# Patient Record
Sex: MALE | Race: WHITE | NOT HISPANIC OR LATINO | Employment: OTHER | URBAN - METROPOLITAN AREA
[De-identification: names, ages, dates, MRNs, and addresses within clinical notes are randomized per-mention and may not be internally consistent; named-entity substitution may affect disease eponyms.]

---

## 2017-01-03 ENCOUNTER — PATIENT OUTREACH (OUTPATIENT)
Dept: HEALTH INFORMATION MANAGEMENT | Facility: OTHER | Age: 82
End: 2017-01-03

## 2017-01-04 ENCOUNTER — PATIENT OUTREACH (OUTPATIENT)
Dept: HEALTH INFORMATION MANAGEMENT | Facility: OTHER | Age: 82
End: 2017-01-04

## 2017-01-10 ENCOUNTER — OFFICE VISIT (OUTPATIENT)
Dept: MEDICAL GROUP | Facility: CLINIC | Age: 82
End: 2017-01-10
Payer: MEDICARE

## 2017-01-10 VITALS
WEIGHT: 181.9 LBS | OXYGEN SATURATION: 94 % | RESPIRATION RATE: 18 BRPM | SYSTOLIC BLOOD PRESSURE: 122 MMHG | HEIGHT: 70 IN | HEART RATE: 68 BPM | TEMPERATURE: 98.1 F | DIASTOLIC BLOOD PRESSURE: 60 MMHG | BODY MASS INDEX: 26.04 KG/M2

## 2017-01-10 DIAGNOSIS — E78.5 DYSLIPIDEMIA: ICD-10-CM

## 2017-01-10 DIAGNOSIS — I10 ESSENTIAL HYPERTENSION: ICD-10-CM

## 2017-01-10 DIAGNOSIS — I25.10 CAD IN NATIVE ARTERY: ICD-10-CM

## 2017-01-10 DIAGNOSIS — R73.09 ABNORMAL BLOOD SUGAR: ICD-10-CM

## 2017-01-10 DIAGNOSIS — Z09 HOSPITAL DISCHARGE FOLLOW-UP: ICD-10-CM

## 2017-01-10 PROCEDURE — 99215 OFFICE O/P EST HI 40 MIN: CPT | Performed by: FAMILY MEDICINE

## 2017-01-10 ASSESSMENT — ENCOUNTER SYMPTOMS
FOCAL WEAKNESS: 0
CONSTIPATION: 0
CONSTITUTIONAL NEGATIVE: 1
SHORTNESS OF BREATH: 0
WHEEZING: 0
HEADACHES: 0
POLYDIPSIA: 0
DIARRHEA: 0
DEPRESSION: 0
MUSCULOSKELETAL NEGATIVE: 1
TINGLING: 0
FEVER: 0
SPUTUM PRODUCTION: 0
ABDOMINAL PAIN: 0
HEARTBURN: 0
BLURRED VISION: 0
PALPITATIONS: 0
BRUISES/BLEEDS EASILY: 0
BLOOD IN STOOL: 0
SENSORY CHANGE: 0
VOMITING: 0
COUGH: 0
ORTHOPNEA: 0
CLAUDICATION: 0
DIZZINESS: 0
NAUSEA: 0
SPEECH CHANGE: 0
CHILLS: 0
DOUBLE VISION: 0

## 2017-01-10 NOTE — PROGRESS NOTES
Subjective:     Jose Angel George is a 87 y.o. male who presents for Hospital Follow-up.  Chart and discharge summary reviewed.   Date of discharge 1/3/17.  48- hour post discharge RN call completed on 1/4/17 and documented in the medical record by Jc Fernandez RN.     HPI: Recently hospitalized for acute MI. Patient had no previous history of coronary artery disease. He was admitted with chest pain and found to have a non-ST elevation MI. He underwent cardiac catheterization with stent placement ×3.    Patient also has a history of hypertension and was on losartan. He was switched to metoprolol.  The patient was also started on atorvastatin. LDL was low at 29.    Review of his hospital records also showed a mildly elevated blood sugar with a hemoglobin A1c of 6.6. He denies any history of diabetes.    Since returning home, patient reports feeling well.     The patient has questions regarding hospitalization or discharge: a few questions about his stent.  The patient denied weakness; denies difficulty taking care of self at home.  Patient reports taking medications as instructed.      Allergies:   Review of patient's allergies indicates no known allergies.    Social History:  Social History   Substance Use Topics   • Smoking status: Former Smoker -- 1.50 packs/day for 25 years     Types: Cigarettes     Quit date: 01/02/1970   • Smokeless tobacco: Never Used   • Alcohol Use: No        ROS:  Review of Systems   Constitutional: Negative.  Negative for fever, chills and malaise/fatigue.   HENT: Negative for congestion.    Eyes: Negative for blurred vision and double vision.   Respiratory: Negative for cough, sputum production, shortness of breath and wheezing.    Cardiovascular: Negative for chest pain, palpitations, orthopnea, claudication and leg swelling.   Gastrointestinal: Negative for heartburn, nausea, vomiting, abdominal pain, diarrhea, constipation, blood in stool and melena.   Genitourinary: Negative  "for dysuria, urgency and frequency.   Musculoskeletal: Negative.    Skin: Negative.    Neurological: Negative for dizziness, tingling, sensory change, speech change, focal weakness and headaches.   Endo/Heme/Allergies: Negative for polydipsia. Does not bruise/bleed easily.   Psychiatric/Behavioral: Negative for depression.        Objective:     Blood pressure 122/60, pulse 68, temperature 36.7 °C (98.1 °F), resp. rate 18, height 1.778 m (5' 10\"), weight 82.509 kg (181 lb 14.4 oz), SpO2 94 %.       Physical Exam:    GEN:  Alert, oriented, in no distress  HEENT:  PERRLA, EOMI, TM's clear bilaterally, oropharynx clear without erythema or exudates.  NECK;  Supple without adenopathy or thyromegaly.  No JVD or bruits.  LUNGS:  Clear to auscultation without rales, rhonci, or wheezes.  CV:  Heart RRR without murmurs or S3 or S4  ABD:  Soft, nontender, nondistended, normal bowel sounds.  No hepatosplenomegaly.  EXT:  Without cyanosis, clubbing, or edema.  NEURO:  Cranial nerves II through XII intact.  Motor function and sensation intact.  SKIN: No rashes or suspicious lesions.  PSYCH:  Behavior is appropriate.        Assessment and Plan:     Hospital follow-up:   Hospitalization and results reviewed with patient. High risk conditions requiring teaching or care coordination were identified and addressed.The patient demonstrate understanding of admission and underlying conditions. The patient understands discharge instructions and when to seek medical attention. Medications reviewed including instructions regarding high risk medications, dosing and side effects.    The patient is able to safely adhere to ADL/IADL, treatment and medication regimen, self-manage of high-risk conditions? Yes   The patient requires physical therapy/home health/DME referral? No   The patient requires referral to care coordination/behavioral health/social work?  No   Patient requires referral for pharmacy consult? No   Advance directive/POLST on " file?  No   Counseled on advance directive?  Yes . He is provided with information.    2. CAD in native artery  Status post stent placement ×3. Cardiology follow-up in 3 days. Continue aspirin and Plavix    3. Essential hypertension  -Continue metoprolol    4. Dyslipidemia  -Continue atorvastatin    5. Abnormal blood sugar  -Follow up with PCP.      Medication Reconciliation  Medication list at end of encounter:   Current Outpatient Prescriptions   Medication Sig Dispense Refill   • aspirin (ASA) 325 MG Tab Take 1 Tab by mouth every day. 30 Tab 2   • atorvastatin (LIPITOR) 80 MG tablet Take 1 Tab by mouth every bedtime. 30 Tab 2   • metoprolol SR (TOPROL XL) 25 MG TABLET SR 24 HR Take 1 Tab by mouth 2 Times a Day. 30 Tab 2   • clopidogrel (PLAVIX) 75 MG Tab Take 1 Tab by mouth every day. 30 Tab 2   • latanoprost (XALATAN) 0.005 % Solution Place 1 Drop in both eyes every evening.     • nitroglycerin (NITROSTAT) 0.4 MG SL Tab Place 1 Tab under tongue as needed for Chest Pain (up to 3 doses 5 minutes apart. If chest pain not gone after 3 doses, call 911). 25 Tab 2     No current facility-administered medications for this visit.       Primary care follow-up:  New health conditions identified during hospitalization? Yes . Coronary Artery Disease  Labs/pathology/imaging requires future PCP follow-up?  Yes . Elevated BS, glycohemoglobin  Changes to medications during hospitalization or today? Yes All medications are new.    Recommended followup: No Follow-up on file. with Pcp Pt States None   Future Appointments       Provider Department Center    1/13/2017 2:40 PM BRENDON Cooper [facility]       Scheduled to establish care with Dr. Silva on 1/13/17.    Patient Instruction  Patient provided with educational material on discharge diagnosis and management of symptoms/red flags. Patient instructed to keep follow-up appointments and to bring written questions and and actual  medications to each office visit. Patient instructed to call PCP/specialist with any problems/questions/concerns. Patient verbalizes understanding and has no further questions at this time.    Face-to-face transitional care management services with high medical decision complexity were provided.

## 2017-01-10 NOTE — PATIENT INSTRUCTIONS
If you need further assistance, or have any questions; concerns or lingering symptoms before seeing your Primary Care Provider or specialist.     Do not hesitate to contact us.     Please contact us at the Post-Hospital Follow Up Program at [phone].   Our offices hours are Monday-Friday 8 am-5 pm.

## 2017-01-13 ENCOUNTER — OFFICE VISIT (OUTPATIENT)
Dept: CARDIOLOGY | Facility: MEDICAL CENTER | Age: 82
End: 2017-01-13
Payer: MEDICARE

## 2017-01-13 VITALS
HEIGHT: 70 IN | BODY MASS INDEX: 26.6 KG/M2 | HEART RATE: 72 BPM | OXYGEN SATURATION: 96 % | WEIGHT: 185.8 LBS | DIASTOLIC BLOOD PRESSURE: 68 MMHG | SYSTOLIC BLOOD PRESSURE: 110 MMHG

## 2017-01-13 DIAGNOSIS — I25.10 CAD IN NATIVE ARTERY: ICD-10-CM

## 2017-01-13 DIAGNOSIS — I44.7 LBBB (LEFT BUNDLE BRANCH BLOCK): ICD-10-CM

## 2017-01-13 DIAGNOSIS — E78.5 DYSLIPIDEMIA: ICD-10-CM

## 2017-01-13 DIAGNOSIS — I10 ESSENTIAL HYPERTENSION: ICD-10-CM

## 2017-01-13 DIAGNOSIS — I21.3 ST ELEVATION MYOCARDIAL INFARCTION (STEMI), UNSPECIFIED ARTERY (HCC): ICD-10-CM

## 2017-01-13 DIAGNOSIS — Z95.5 STENTED CORONARY ARTERY: ICD-10-CM

## 2017-01-13 LAB — EKG IMPRESSION: NORMAL

## 2017-01-13 PROCEDURE — 99214 OFFICE O/P EST MOD 30 MIN: CPT | Performed by: NURSE PRACTITIONER

## 2017-01-13 PROCEDURE — 93000 ELECTROCARDIOGRAM COMPLETE: CPT | Performed by: NURSE PRACTITIONER

## 2017-01-13 ASSESSMENT — ENCOUNTER SYMPTOMS
SHORTNESS OF BREATH: 0
HEARTBURN: 0
NAUSEA: 0
TREMORS: 0
ABDOMINAL PAIN: 0
WEAKNESS: 0
MUSCULOSKELETAL NEGATIVE: 1
ORTHOPNEA: 0
BLURRED VISION: 0
LOSS OF CONSCIOUSNESS: 0
HEADACHES: 0
CHILLS: 0
TINGLING: 0
DIARRHEA: 0
DOUBLE VISION: 0
VOMITING: 0
SPUTUM PRODUCTION: 0
SENSORY CHANGE: 0
HEMOPTYSIS: 0
DIZZINESS: 0
SPEECH CHANGE: 0
PALPITATIONS: 0
FOCAL WEAKNESS: 0
SORE THROAT: 0
WEIGHT LOSS: 0
FEVER: 0
BLOOD IN STOOL: 0
PND: 0
WHEEZING: 0
COUGH: 0

## 2017-01-13 NOTE — MR AVS SNAPSHOT
"        Jose Angel George   2017 2:40 PM   Office Visit   MRN: [id number]    Department:  [facility]   Dept Phone:  [phone]    Description:  Male : 3/31/1929   Provider:  BRENDON Cooper           Reason for Visit     Hospital Follow-up           Allergies as of 2017     No Known Allergies      You were diagnosed with     ST elevation myocardial infarction (STEMI), unspecified artery (HCC)   [9598367]       Essential hypertension   [5265809]       CAD in native artery   [946850]       Dyslipidemia   [045747]       Stented coronary artery   [680603]       LBBB (left bundle branch block)   [096424]         Vital Signs     Blood Pressure Pulse Height Weight Body Mass Index Oxygen Saturation    110/68 mmHg 72 1.778 m (5' 10\") 84.278 kg (185 lb 12.8 oz) 26.66 kg/m2 96%    Smoking Status                   Former Smoker           Basic Information     Date Of Birth Sex Race Ethnicity Preferred Language    3/31/1929 Male White Non- English      Your appointments     2017  1:00 PM   FOLLOW UP with SJ Castro   [facility] (--)    [address], [address]  [place] [address] [phone]              Problem List              ICD-10-CM Priority Class Noted - Resolved    Essential hypertension I10   2016 - Present    Primary osteoarthritis involving multiple joints M15.0   2016 - Present    Abnormal blood sugar R73.09   1/10/2017 - Present    CAD in native artery I25.10   1/10/2017 - Present    Dyslipidemia E78.5   1/10/2017 - Present    Stented coronary artery Z95.5   2017 - Present    LBBB (left bundle branch block) I44.7   2017 - Present      Health Maintenance        Date Due Completion Dates    IMM DTaP/Tdap/Td Vaccine (1 - Tdap) 3/31/1948 ---    IMM ZOSTER VACCINE 3/31/1989 ---    IMM PNEUMOCOCCAL 65+ (ADULT) LOW/MEDIUM RISK SERIES (1 of 2 - PCV13) 3/31/1994 ---            Results       Current " Immunizations     Influenza Vaccine Adult HD 10/11/2016 10:24 AM      Below and/or attached are the medications your provider expects you to take. Review all of your home medications and newly ordered medications with your provider and/or pharmacist. Follow medication instructions as directed by your provider and/or pharmacist. Please keep your medication list with you and share with your provider. Update the information when medications are discontinued, doses are changed, or new medications (including over-the-counter products) are added; and carry medication information at all times in the event of emergency situations     Allergies:  No Known Allergies          Medications  Valid as of: January 13, 2017 -  3:04 PM    Generic Name Brand Name Tablet Size Instructions for use    Aspirin (Tab)  MG Take 1 Tab by mouth every day.        Atorvastatin Calcium (Tab) LIPITOR 80 MG Take 1 Tab by mouth every bedtime.        Clopidogrel Bisulfate (Tab) PLAVIX 75 MG Take 1 Tab by mouth every day.        Latanoprost (Solution) XALATAN 0.005 % Place 1 Drop in both eyes every evening.        Metoprolol Succinate (TABLET SR 24 HR) TOPROL XL 25 MG Take 1 Tab by mouth 2 Times a Day.        Nitroglycerin (SL Tab) NITROSTAT 0.4 MG Place 1 Tab under tongue as needed for Chest Pain (up to 3 doses 5 minutes apart. If chest pain not gone after 3 doses, call 911).        .                 Medicines prescribed today were sent to:     [facility] [address] [place] [address] - [address]    [address] [address]    Phone: [phone] Fax: [phone]    Open 24 Hours?: Yes      Medication refill instructions:       If your prescription bottle indicates you have medication refills left, it is not necessary to call your provider’s office. Please contact your pharmacy and they will refill your medication.    If your prescription bottle indicates you do not have any refills left, you may  request refills at any time through one of the following ways: The online [employer] system (except Urgent Care), by calling your provider’s office, or by asking your pharmacy to contact your provider’s office with a refill request. Medication refills are processed only during regular business hours and may not be available until the next business day. Your provider may request additional information or to have a follow-up visit with you prior to refilling your medication.   *Please Note: Medication refills are assigned a new Rx number when refilled electronically. Your pharmacy may indicate that no refills were authorized even though a new prescription for the same medication is available at the pharmacy. Please request the medicine by name with the pharmacy before contacting your provider for a refill.           MyChart Status: Patient Declined

## 2017-01-13 NOTE — PROGRESS NOTES
Subjective:   Jose Angel George is a 87 y.o. male who presents today for hospital follow up after STEMI and placement of JAZMYNE to RCA x 2(proximal, mid) and Distal Circ.    Angiogram conclusions per Dr. Serna's cath note:  1.  EF 56%.  Inferior wall motion abnormalities.  2.  LAD mid diffuse moderate.  3.   Diagonal ostial 70%.  Proximal 90%.  4.  Ramus is a tiny caliber mid 95%.  5.  Circumflex distal 90%.  6.  RCA proximal 95%, mid 90%.  7.  Proximal RCA stents a 4.0x16 mm JAZMYNE.  8.  Mid RCA stent 4.0x24 mm JAZMYNE.  9.  Distal circumflex 2.75x12 mm JAZMYNE.    Today in follow up he states that he has not had any recurrence of chest pain/pressure or angina symptoms since discharge.  He has not had any dyspnea, presyncope, syncope, or peripheral edema.    He states that he has taken his medication as prescribed but has yet to  his PRN NTG from the pharmacy secondary to the weather.      Past Medical History   Diagnosis Date   • Hypertension      Past Surgical History   Procedure Laterality Date   • Hernia repair     • Rotator cuff repair     • Eye surgery       Family History   Problem Relation Age of Onset   • Stroke Mother    • Lung Disease Father    • Cancer Father    • Hypertension Father    • Heart Disease Father    • Hypertension Brother    • Diabetes Brother    • Heart Disease Brother      History   Smoking status   • Former Smoker -- 1.50 packs/day for 25 years   • Types: Cigarettes   • Quit date: 01/02/1970   Smokeless tobacco   • Never Used     No Known Allergies  Outpatient Encounter Prescriptions as of 1/13/2017   Medication Sig Dispense Refill   • aspirin (ASA) 325 MG Tab Take 1 Tab by mouth every day. 30 Tab 2   • atorvastatin (LIPITOR) 80 MG tablet Take 1 Tab by mouth every bedtime. 30 Tab 2   • metoprolol SR (TOPROL XL) 25 MG TABLET SR 24 HR Take 1 Tab by mouth 2 Times a Day. 30 Tab 2   • clopidogrel (PLAVIX) 75 MG Tab Take 1 Tab by mouth every day. 30 Tab 2   • latanoprost (XALATAN) 0.005 %  "Solution Place 1 Drop in both eyes every evening.     • nitroglycerin (NITROSTAT) 0.4 MG SL Tab Place 1 Tab under tongue as needed for Chest Pain (up to 3 doses 5 minutes apart. If chest pain not gone after 3 doses, call 911). 25 Tab 2     No facility-administered encounter medications on file as of 1/13/2017.     Review of Systems   Constitutional: Negative for fever, chills, weight loss and malaise/fatigue.   HENT: Negative for congestion, sore throat and tinnitus.    Eyes: Negative for blurred vision and double vision.   Respiratory: Negative for cough, hemoptysis, sputum production, shortness of breath and wheezing.    Cardiovascular: Negative for chest pain, palpitations, orthopnea, leg swelling and PND.   Gastrointestinal: Negative for heartburn, nausea, vomiting, abdominal pain, diarrhea, blood in stool and melena.   Genitourinary: Negative.    Musculoskeletal: Negative.    Skin: Negative for rash.   Neurological: Negative for dizziness, tingling, tremors, sensory change, speech change, focal weakness, loss of consciousness, weakness and headaches.        Objective:   /68 mmHg  Pulse 72  Ht 1.778 m (5' 10\")  Wt 84.278 kg (185 lb 12.8 oz)  BMI 26.66 kg/m2  SpO2 96%    Physical Exam   Constitutional: He is oriented to person, place, and time. He appears well-developed and well-nourished.   HENT:   Head: Normocephalic and atraumatic.   Eyes: Conjunctivae are normal.   Neck: Normal range of motion. Neck supple. No JVD present.   Cardiovascular: Normal rate, regular rhythm, normal heart sounds and intact distal pulses.  Exam reveals no gallop and no friction rub.    No murmur heard.  Pulmonary/Chest: Effort normal and breath sounds normal. No respiratory distress. He has no wheezes. He has no rales. He exhibits no tenderness.   Abdominal: Soft. Bowel sounds are normal. There is no tenderness.   Musculoskeletal: He exhibits no edema.   Neurological: He is alert and oriented to person, place, and time. "   Skin: Skin is warm and dry.   Psychiatric: He has a normal mood and affect. His behavior is normal.       Assessment:     1. ST elevation myocardial infarction (STEMI), unspecified artery (HCC)     2. Essential hypertension  EKG   3. CAD in native artery     4. Dyslipidemia     5. Stented coronary artery         Medical Decision Making:  Today's Assessment / Status / Plan:   1. STEMI S/P JAZMYNE x 2 RCA, JAZMYNE x1 distal CIRC:  - Patient is clinically doing doing well post PCI.  He has had no recurrence of chest pain or angina symptoms.   - We reviewed his medications and indications for medications.  He was under the impression he only needed medications for 3 moths.  I did clarify with him that he will need medication compliance long term and DAPT for at least 1 year.  - We reviewed diet (heart healthy, low sodium) and activity guidelines.  He verbalizes understanding of this.    - Continue ASA, Plavix, Toprol, atorvastatin.     2. LBBB:  - Stable on EKG in office today.    3. Dyslipidemia:  - LDL 50, HDL low at 29.  Tg ok.  Continue Atorvastatin 80 mg.  He denies myalgias.     He travels between 3 locations yearly.  I would like him to be seen once more before he leaves town so he will be seen in approximately 6 weeks for review, sooner if needed.  Collaborating MD/ADD: Kisha.

## 2017-02-19 ENCOUNTER — PATIENT OUTREACH (OUTPATIENT)
Dept: HEALTH INFORMATION MANAGEMENT | Facility: OTHER | Age: 82
End: 2017-02-19

## 2017-02-20 NOTE — PROGRESS NOTES
Attempt #:1     Annual Wellness Visit Scheduling  1. Scheduling Status:Not Scheduled. Patient states they are not interested   [facility]     [facility]

## 2017-02-27 ENCOUNTER — OFFICE VISIT (OUTPATIENT)
Dept: CARDIOLOGY | Facility: MEDICAL CENTER | Age: 82
End: 2017-02-27
Payer: MEDICARE

## 2017-02-27 VITALS
WEIGHT: 173 LBS | BODY MASS INDEX: 24.77 KG/M2 | HEART RATE: 61 BPM | DIASTOLIC BLOOD PRESSURE: 62 MMHG | OXYGEN SATURATION: 91 % | SYSTOLIC BLOOD PRESSURE: 130 MMHG | HEIGHT: 70 IN

## 2017-02-27 DIAGNOSIS — E78.5 DYSLIPIDEMIA: ICD-10-CM

## 2017-02-27 DIAGNOSIS — I10 ESSENTIAL HYPERTENSION: ICD-10-CM

## 2017-02-27 DIAGNOSIS — Z95.5 STENTED CORONARY ARTERY: ICD-10-CM

## 2017-02-27 DIAGNOSIS — I25.10 CAD IN NATIVE ARTERY: Primary | ICD-10-CM

## 2017-02-27 DIAGNOSIS — H91.91 HEARING LOSS OF RIGHT EAR: ICD-10-CM

## 2017-02-27 DIAGNOSIS — I44.7 LBBB (LEFT BUNDLE BRANCH BLOCK): ICD-10-CM

## 2017-02-27 PROCEDURE — 1101F PT FALLS ASSESS-DOCD LE1/YR: CPT | Performed by: NURSE PRACTITIONER

## 2017-02-27 PROCEDURE — G8432 DEP SCR NOT DOC, RNG: HCPCS | Performed by: NURSE PRACTITIONER

## 2017-02-27 PROCEDURE — 1036F TOBACCO NON-USER: CPT | Performed by: NURSE PRACTITIONER

## 2017-02-27 PROCEDURE — 99214 OFFICE O/P EST MOD 30 MIN: CPT | Performed by: NURSE PRACTITIONER

## 2017-02-27 PROCEDURE — G8420 CALC BMI NORM PARAMETERS: HCPCS | Performed by: NURSE PRACTITIONER

## 2017-02-27 PROCEDURE — G8598 ASA/ANTIPLAT THER USED: HCPCS | Performed by: NURSE PRACTITIONER

## 2017-02-27 PROCEDURE — G8482 FLU IMMUNIZE ORDER/ADMIN: HCPCS | Performed by: NURSE PRACTITIONER

## 2017-02-27 PROCEDURE — 4040F PNEUMOC VAC/ADMIN/RCVD: CPT | Mod: 8P | Performed by: NURSE PRACTITIONER

## 2017-02-27 RX ORDER — ATORVASTATIN CALCIUM 80 MG/1
80 TABLET, FILM COATED ORAL EVERY EVENING
Qty: 90 TAB | Refills: 3 | Status: SHIPPED | OUTPATIENT
Start: 2017-02-27 | End: 2018-10-01 | Stop reason: SDUPTHER

## 2017-02-27 RX ORDER — ASPIRIN 81 MG/1
81 TABLET, CHEWABLE ORAL DAILY
Qty: 100 TAB | Refills: 3 | COMMUNITY
Start: 2017-02-27 | End: 2017-03-01 | Stop reason: SDUPTHER

## 2017-02-27 RX ORDER — CLOPIDOGREL BISULFATE 75 MG/1
75 TABLET ORAL DAILY
Qty: 90 TAB | Refills: 3 | Status: SHIPPED | OUTPATIENT
Start: 2017-02-27 | End: 2018-04-18 | Stop reason: SDUPTHER

## 2017-02-27 RX ORDER — METOPROLOL SUCCINATE 25 MG/1
25 TABLET, EXTENDED RELEASE ORAL 2 TIMES DAILY
Qty: 180 TAB | Refills: 3 | Status: SHIPPED | OUTPATIENT
Start: 2017-02-27 | End: 2018-10-01 | Stop reason: SDUPTHER

## 2017-02-27 ASSESSMENT — ENCOUNTER SYMPTOMS
CLAUDICATION: 0
SHORTNESS OF BREATH: 0
COUGH: 0
ORTHOPNEA: 0
ABDOMINAL PAIN: 0
PND: 0
DIZZINESS: 0
WEAKNESS: 0
MYALGIAS: 0
PALPITATIONS: 0

## 2017-02-27 NOTE — PROGRESS NOTES
Subjective:   Jose Angel George is a 87 y.o. male who presents today for follow-up on coronary artery disease and ST elevation MI in January. [facility] after receiving 3 stents for his coronary artery disease. He received drug-eluting stents to the mid RCA, distal RCA and proximal circumflex.     Since being out of the hospital, he has not had any chest symptoms. He did come down with a bronchitis which caused him to have a bronchospastic cough for proximally 2 weeks. This was treated with antiemetics and his cough has resolved.    He denies any shortness of breath except when he had the bronchitis. No ankle edema. He does complain of bilateral hip pain which makes it difficult for him to walk.    He spends part of the year down in [facility] and will be heading down there on Sunday, March 5, 2017. He states he might not return to [place] due to the snow and the difficulties of the delgado here.    Past Medical History   Diagnosis Date   • Hypertension    • Hyperlipidemia      Past Surgical History   Procedure Laterality Date   • Hernia repair     • Rotator cuff repair     • Eye surgery     • Cardiac cath  12/31/16     2 stents RCA, 1 stent Circ, All JAZMYNE.     Family History   Problem Relation Age of Onset   • Stroke Mother    • Lung Disease Father    • Cancer Father    • Hypertension Father    • Heart Disease Father    • Hypertension Brother    • Diabetes Brother    • Heart Disease Brother      History   Smoking status   • Former Smoker -- 1.50 packs/day for 25 years   • Types: Cigarettes   • Quit date: 01/02/1970   Smokeless tobacco   • Never Used     No Known Allergies  Outpatient Encounter Prescriptions as of 2/27/2017   Medication Sig Dispense Refill   • aspirin (ASA) 81 MG Chew Tab chewable tablet Take 1 Tab by mouth every day. 100 Tab 3   • atorvastatin (LIPITOR) 80 MG tablet Take 1 Tab by mouth every evening. 90 Tab 3   • metoprolol SR (TOPROL XL) 25 MG TABLET SR 24  "HR Take 1 Tab by mouth 2 Times a Day. 180 Tab 3   • clopidogrel (PLAVIX) 75 MG Tab Take 1 Tab by mouth every day. 90 Tab 3   • nitroglycerin (NITROSTAT) 0.4 MG SL Tab Place 1 Tab under tongue as needed for Chest Pain (up to 3 doses 5 minutes apart. If chest pain not gone after 3 doses, call 911). 25 Tab 2   • latanoprost (XALATAN) 0.005 % Solution Place 1 Drop in both eyes every evening.     • [DISCONTINUED] aspirin (ASA) 325 MG Tab Take 1 Tab by mouth every day. 30 Tab 2   • [DISCONTINUED] atorvastatin (LIPITOR) 80 MG tablet Take 1 Tab by mouth every bedtime. 30 Tab 2   • [DISCONTINUED] metoprolol SR (TOPROL XL) 25 MG TABLET SR 24 HR Take 1 Tab by mouth 2 Times a Day. 30 Tab 2   • [DISCONTINUED] clopidogrel (PLAVIX) 75 MG Tab Take 1 Tab by mouth every day. 30 Tab 2     No facility-administered encounter medications on file as of 2/27/2017.     Review of Systems   Constitutional: Negative for malaise/fatigue.   HENT: Positive for hearing loss (right ear.).    Respiratory: Negative for cough and shortness of breath.    Cardiovascular: Negative for chest pain, palpitations, orthopnea, claudication, leg swelling and PND.   Gastrointestinal: Negative for abdominal pain.   Musculoskeletal: Positive for joint pain (bilateral hip arthritis). Negative for myalgias.   Neurological: Negative for dizziness and weakness.        Objective:   /62 mmHg  Pulse 61  Ht 1.778 m (5' 10\")  Wt 78.472 kg (173 lb)  BMI 24.82 kg/m2  SpO2 91%    Physical Exam   Constitutional: He appears well-developed and well-nourished.   HENT:   Head: Normocephalic.   Hearing loss right side, long-standing.   Eyes: Conjunctivae are normal.   Neck: No JVD present. No thyromegaly present.   Cardiovascular: Normal rate, regular rhythm and normal heart sounds.    Pulmonary/Chest: Effort normal and breath sounds normal. He has no wheezes. He has no rales.   Abdominal: Soft. Bowel sounds are normal. He exhibits no distension. There is no tenderness. "   Musculoskeletal: He exhibits no edema.   Neurological: He is alert.   Skin: Skin is warm and dry.   Psychiatric: He has a normal mood and affect.     JR first 2017: Transthoracic Echo Report  Contrast was used to enhance visualization of the endocardial border.  Left ventricular systolic function is mildly reduced.  Left ventricular ejection fraction is visually estimated to be 50%.  Global hypokinesis most prominent in the inferior and inferolateral   walls.  Grade I diastolic dysfunction.  The right ventricle was normal in size and function.  No evidence of valvular abnormality based on Doppler evaluation.     Results for LACI CHOWDARY (MRN [id number]) as of 2/27/2017 14:01   Ref. Range 1/1/2017 04:35   Sodium Latest Ref Range: 135-145 mmol/L 137   Potassium Latest Ref Range: 3.6-5.5 mmol/L 4.1   Chloride Latest Ref Range:  mmol/L 108   Co2 Latest Ref Range: 20-33 mmol/L 22   Anion Gap Latest Ref Range: 0.0-11.9  7.0   Glucose Latest Ref Range: 65-99 mg/dL 116 (H)   Bun Latest Ref Range: 8-22 mg/dL 22   Creatinine Latest Ref Range: 0.50-1.40 mg/dL 1.10   GFR If  Latest Ref Range: >60 mL/min/1.73 m 2 >60   GFR If Non  Latest Ref Range: >60 mL/min/1.73 m 2 >60   Calcium Latest Ref Range: 8.5-10.5 mg/dL 8.8   Glycohemoglobin Latest Ref Range: 0.0-5.6 % 6.6 (H)   Estim. Avg Glu Latest Units: mg/dL 143   Cholesterol,Tot Latest Ref Range: 100-199 mg/dL 97 (L)   Triglycerides Latest Ref Range: 0-149 mg/dL 90   HDL Latest Ref Range: >=40 mg/dL 29 (A)   LDL Latest Ref Range: <100 mg/dL 50       Assessment:     1. CAD in native artery  metoprolol SR (TOPROL XL) 25 MG TABLET SR 24 HR   2. Stented coronary artery  clopidogrel (PLAVIX) 75 MG Tab   3. LBBB (left bundle branch block)     4. Dyslipidemia  atorvastatin (LIPITOR) 80 MG tablet    COMP METABOLIC PANEL    LIPID PROFILE   5. Essential hypertension     6. Hearing loss of right ear         Medical Decision Making:  Today's  Assessment / Status / Plan:     Coronary artery disease: Status post ST elevation MI. Patient had a peak of troponin of 133 which was probably due to flush out from being stented. His ejection fraction is 50% and shows some inferior hypokinesis therefore he probably has not had much long-term damage. I expect that he would be able to function normally. He has not had any exertional chest discomfort. I will reduce aspirin to 81 mg. He understands that he will need to be on Plavix for least a year.    Left bundle branch block: Present on presentation to the hospital. Unknown if that is long-standing.    Dyslipidemia: He has a low HDL which is probably the reason that he has coronary artery disease. We'll check lipids and metabolic panel at 3 months.    Hypertension: His blood pressure is well-controlled. He tells me that he was previously on losartan for blood pressure which has been discontinued. His blood pressure becomes elevated we could restart losartan if needed.    Hearing loss: He is rather hard of hearing.  I have encouraged him to get a hearing aid.    He tells me that he is planning on going to [facility] and leaving on March 5, 2017. He plans to establish care in that area. I would like him to follow-up at 3 months and do lab work one week prior to his appointment. He will follow up sooner if he has exertional chest symptoms. He is always welcome to return to our practice.    Collaborating Provider: Dr. Henry Gomez.

## 2017-02-27 NOTE — Clinical Note
[facility]   [address], [facility] [address]  [place] [address]  Phone: [phone]  Fax: [phone]              Jose Angel George  3/31/1929    Encounter Date: 2/27/2017    SJ Castro          PROGRESS NOTE:  Subjective:   Jose Angel George is a 87 y.o. male who presents today for follow-up on coronary artery disease and ST elevation MI in January. He was [facility] after receiving 3 stents for his coronary artery disease. He received drug-eluting stents to the mid RCA, distal RCA and proximal circumflex.     Since being out of the hospital, he has not had any chest symptoms. He did come down with a bronchitis which caused him to have a bronchospastic cough for proximally 2 weeks. This was treated with antiemetics and his cough has resolved.    He denies any shortness of breath except when he had the bronchitis. No ankle edema. He does complain of bilateral hip pain which makes it difficult for him to walk.    He spends part of the year down in [facility] and will be heading down there on Sunday, March 5, 2017. He states he might not return to [place] due to the snow and the difficulties of the delgado here.    Past Medical History   Diagnosis Date   • Hypertension    • Hyperlipidemia      Past Surgical History   Procedure Laterality Date   • Hernia repair     • Rotator cuff repair     • Eye surgery     • Cardiac cath  12/31/16     2 stents RCA, 1 stent Circ, All JAZMYNE.     Family History   Problem Relation Age of Onset   • Stroke Mother    • Lung Disease Father    • Cancer Father    • Hypertension Father    • Heart Disease Father    • Hypertension Brother    • Diabetes Brother    • Heart Disease Brother      History   Smoking status   • Former Smoker -- 1.50 packs/day for 25 years   • Types: Cigarettes   • Quit date: 01/02/1970   Smokeless tobacco   • Never Used     No Known Allergies  Outpatient Encounter Prescriptions as  "of 2/27/2017   Medication Sig Dispense Refill   • aspirin (ASA) 81 MG Chew Tab chewable tablet Take 1 Tab by mouth every day. 100 Tab 3   • atorvastatin (LIPITOR) 80 MG tablet Take 1 Tab by mouth every evening. 90 Tab 3   • metoprolol SR (TOPROL XL) 25 MG TABLET SR 24 HR Take 1 Tab by mouth 2 Times a Day. 180 Tab 3   • clopidogrel (PLAVIX) 75 MG Tab Take 1 Tab by mouth every day. 90 Tab 3   • nitroglycerin (NITROSTAT) 0.4 MG SL Tab Place 1 Tab under tongue as needed for Chest Pain (up to 3 doses 5 minutes apart. If chest pain not gone after 3 doses, call 911). 25 Tab 2   • latanoprost (XALATAN) 0.005 % Solution Place 1 Drop in both eyes every evening.     • [DISCONTINUED] aspirin (ASA) 325 MG Tab Take 1 Tab by mouth every day. 30 Tab 2   • [DISCONTINUED] atorvastatin (LIPITOR) 80 MG tablet Take 1 Tab by mouth every bedtime. 30 Tab 2   • [DISCONTINUED] metoprolol SR (TOPROL XL) 25 MG TABLET SR 24 HR Take 1 Tab by mouth 2 Times a Day. 30 Tab 2   • [DISCONTINUED] clopidogrel (PLAVIX) 75 MG Tab Take 1 Tab by mouth every day. 30 Tab 2     No facility-administered encounter medications on file as of 2/27/2017.     Review of Systems   Constitutional: Negative for malaise/fatigue.   HENT: Positive for hearing loss (right ear.).    Respiratory: Negative for cough and shortness of breath.    Cardiovascular: Negative for chest pain, palpitations, orthopnea, claudication, leg swelling and PND.   Gastrointestinal: Negative for abdominal pain.   Musculoskeletal: Positive for joint pain (bilateral hip arthritis). Negative for myalgias.   Neurological: Negative for dizziness and weakness.        Objective:   /62 mmHg  Pulse 61  Ht 1.778 m (5' 10\")  Wt 78.472 kg (173 lb)  BMI 24.82 kg/m2  SpO2 91%    Physical Exam   Constitutional: He appears well-developed and well-nourished.   HENT:   Head: Normocephalic.   Hearing loss right side, long-standing.   Eyes: Conjunctivae are normal.   Neck: No JVD present. No thyromegaly " present.   Cardiovascular: Normal rate, regular rhythm and normal heart sounds.    Pulmonary/Chest: Effort normal and breath sounds normal. He has no wheezes. He has no rales.   Abdominal: Soft. Bowel sounds are normal. He exhibits no distension. There is no tenderness.   Musculoskeletal: He exhibits no edema.   Neurological: He is alert.   Skin: Skin is warm and dry.   Psychiatric: He has a normal mood and affect.     JR first 2017: Transthoracic Echo Report  Contrast was used to enhance visualization of the endocardial border.  Left ventricular systolic function is mildly reduced.  Left ventricular ejection fraction is visually estimated to be 50%.  Global hypokinesis most prominent in the inferior and inferolateral   walls.  Grade I diastolic dysfunction.  The right ventricle was normal in size and function.  No evidence of valvular abnormality based on Doppler evaluation.     Results for LACI CHOWDARY (MRN [id number]) as of 2/27/2017 14:01   Ref. Range 1/1/2017 04:35   Sodium Latest Ref Range: 135-145 mmol/L 137   Potassium Latest Ref Range: 3.6-5.5 mmol/L 4.1   Chloride Latest Ref Range:  mmol/L 108   Co2 Latest Ref Range: 20-33 mmol/L 22   Anion Gap Latest Ref Range: 0.0-11.9  7.0   Glucose Latest Ref Range: 65-99 mg/dL 116 (H)   Bun Latest Ref Range: 8-22 mg/dL 22   Creatinine Latest Ref Range: 0.50-1.40 mg/dL 1.10   GFR If  Latest Ref Range: >60 mL/min/1.73 m 2 >60   GFR If Non  Latest Ref Range: >60 mL/min/1.73 m 2 >60   Calcium Latest Ref Range: 8.5-10.5 mg/dL 8.8   Glycohemoglobin Latest Ref Range: 0.0-5.6 % 6.6 (H)   Estim. Avg Glu Latest Units: mg/dL 143   Cholesterol,Tot Latest Ref Range: 100-199 mg/dL 97 (L)   Triglycerides Latest Ref Range: 0-149 mg/dL 90   HDL Latest Ref Range: >=40 mg/dL 29 (A)   LDL Latest Ref Range: <100 mg/dL 50       Assessment:     1. CAD in native artery  metoprolol SR (TOPROL XL) 25 MG TABLET SR 24 HR   2. Stented coronary artery   clopidogrel (PLAVIX) 75 MG Tab   3. LBBB (left bundle branch block)     4. Dyslipidemia  atorvastatin (LIPITOR) 80 MG tablet    COMP METABOLIC PANEL    LIPID PROFILE   5. Essential hypertension     6. Hearing loss of right ear         Medical Decision Making:  Today's Assessment / Status / Plan:     Coronary artery disease: Status post ST elevation MI. Patient had a peak of troponin of 133 which was probably due to flush out from being stented. His ejection fraction is 50% and shows some inferior hypokinesis therefore he probably has not had much long-term damage. I expect that he would be able to function normally. He has not had any exertional chest discomfort. I will reduce aspirin to 81 mg. He understands that he will need to be on Plavix for least a year.    Left bundle branch block: Present on presentation to the hospital. Unknown if that is long-standing.    Dyslipidemia: He has a low HDL which is probably the reason that he has coronary artery disease. We'll check lipids and metabolic panel at 3 months.    Hypertension: His blood pressure is well-controlled. He tells me that he was previously on losartan for blood pressure which has been discontinued. His blood pressure becomes elevated we could restart losartan if needed.    Hearing loss: He is rather hard of hearing.  I have encouraged him to get a hearing aid.    He tells me that he is planning on going to [facility] and leaving on March 5, 2017. He plans to establish care in that area. I would like him to follow-up at 3 months and do lab work one week prior to his appointment. He will follow up sooner if he has exertional chest symptoms. He is always welcome to return to our practice.    Collaborating Provider: Dr. Henry Gomez.        No Recipients

## 2017-02-27 NOTE — MR AVS SNAPSHOT
"        Jose Angel George   2017 1:00 PM   Office Visit   MRN: [id number]    Department:  [facility]   Dept Phone:  [phone]    Description:  Male : 3/31/1929   Provider:  SJ Castro           Reason for Visit     Follow-Up           Allergies as of 2017     No Known Allergies      You were diagnosed with     CAD in native artery   [998974]  -  Primary     Stented coronary artery   [079427]       LBBB (left bundle branch block)   [642755]       Dyslipidemia   [584808]       Essential hypertension   [6947039]         Vital Signs     Blood Pressure Pulse Height Weight Body Mass Index Oxygen Saturation    130/62 mmHg 61 1.778 m (5' 10\") 78.472 kg (173 lb) 24.82 kg/m2 91%    Smoking Status                   Former Smoker           Basic Information     Date Of Birth Sex Race Ethnicity Preferred Language    3/31/1929 Male White Non- English      Problem List              ICD-10-CM Priority Class Noted - Resolved    Essential hypertension I10   2016 - Present    Primary osteoarthritis involving multiple joints M15.0   2016 - Present    Abnormal blood sugar R73.09   1/10/2017 - Present    CAD in native artery I25.10   1/10/2017 - Present    Dyslipidemia E78.5   1/10/2017 - Present    Stented coronary artery Z95.5   2017 - Present    LBBB (left bundle branch block) I44.7   2017 - Present      Health Maintenance        Date Due Completion Dates    IMM DTaP/Tdap/Td Vaccine (1 - Tdap) 3/31/1948 ---    IMM ZOSTER VACCINE 3/31/1989 ---    IMM PNEUMOCOCCAL 65+ (ADULT) LOW/MEDIUM RISK SERIES (1 of 2 - PCV13) 3/31/1994 ---            Current Immunizations     Influenza Vaccine Adult HD 10/11/2016 10:24 AM      Below and/or attached are the medications your provider expects you to take. Review all of your home medications and newly ordered medications with your provider and/or pharmacist. Follow medication instructions as directed by your provider and/or pharmacist. " Please keep your medication list with you and share with your provider. Update the information when medications are discontinued, doses are changed, or new medications (including over-the-counter products) are added; and carry medication information at all times in the event of emergency situations     Allergies:  No Known Allergies          Medications  Valid as of: February 27, 2017 -  1:52 PM    Generic Name Brand Name Tablet Size Instructions for use    Aspirin (Chew Tab) ASA 81 MG Take 1 Tab by mouth every day.        Atorvastatin Calcium (Tab) LIPITOR 80 MG Take 1 Tab by mouth every evening.        Clopidogrel Bisulfate (Tab) PLAVIX 75 MG Take 1 Tab by mouth every day.        Latanoprost (Solution) XALATAN 0.005 % Place 1 Drop in both eyes every evening.        Metoprolol Succinate (TABLET SR 24 HR) TOPROL XL 25 MG Take 1 Tab by mouth 2 Times a Day.        Nitroglycerin (SL Tab) NITROSTAT 0.4 MG Place 1 Tab under tongue as needed for Chest Pain (up to 3 doses 5 minutes apart. If chest pain not gone after 3 doses, call 911).        .                 Medicines prescribed today were sent to:     [facility] [address] [place] [address] [address]    [address] [address]    Phone: [phone] Fax: [phone]    Open 24 Hours?: Yes      Medication refill instructions:       If your prescription bottle indicates you have medication refills left, it is not necessary to call your provider’s office. Please contact your pharmacy and they will refill your medication.    If your prescription bottle indicates you do not have any refills left, you may request refills at any time through one of the following ways: The online [email or web address] system (except Urgent Care), by calling your provider’s office, or by asking your pharmacy to contact your provider’s office with a refill request. Medication refills are processed only during regular business hours and may not be available  until the next business day. Your provider may request additional information or to have a follow-up visit with you prior to refilling your medication.   *Please Note: Medication refills are assigned a new Rx number when refilled electronically. Your pharmacy may indicate that no refills were authorized even though a new prescription for the same medication is available at the pharmacy. Please request the medicine by name with the pharmacy before contacting your provider for a refill.        Your To Do List     Future Labs/Procedures Complete By Expires    COMP METABOLIC PANEL  As directed 2/27/2018    LIPID PROFILE  As directed 2/27/2018         MyChart Status: Patient Declined

## 2017-03-01 DIAGNOSIS — Z95.5 STENTED CORONARY ARTERY: ICD-10-CM

## 2017-03-01 RX ORDER — ASPIRIN 81 MG/1
81 TABLET, CHEWABLE ORAL DAILY
Qty: 100 TAB | Refills: 3 | Status: SHIPPED | OUTPATIENT
Start: 2017-03-01 | End: 2018-10-01 | Stop reason: SDUPTHER

## 2017-04-19 NOTE — PROGRESS NOTES
Attempt #:2    Verify PCP: no    Communication Preference Obtained: yes     Review Care Team: yes    Annual Wellness Visit Scheduling  1. Scheduling Status:Not Scheduled. Patient states they moved out of the area       Care Gap Scheduling (Attempt to Schedule EACH Overdue Care Gap!)     Health Maintenance Due   Topic Date Due   • Annual Wellness Visit  03/31/1929   • IMM DTaP/Tdap/Td Vaccine (1 - Tdap) 03/31/1948   • IMM ZOSTER VACCINE  03/31/1989   • IMM PNEUMOCOCCAL 65+ (ADULT) LOW/MEDIUM RISK SERIES (1 of 2 - PCV13) 03/31/1994         Datezr Activation: declined  Datezr Severo: no  Virtual Visits: no  Opt In to Text Messages: no

## 2017-07-10 ENCOUNTER — TELEPHONE (OUTPATIENT)
Dept: CARDIOLOGY | Facility: MEDICAL CENTER | Age: 82
End: 2017-07-10

## 2018-04-18 ENCOUNTER — OFFICE VISIT (OUTPATIENT)
Dept: CARDIOLOGY | Facility: MEDICAL CENTER | Age: 83
End: 2018-04-18
Payer: MEDICARE

## 2018-04-18 VITALS
OXYGEN SATURATION: 92 % | DIASTOLIC BLOOD PRESSURE: 70 MMHG | HEIGHT: 69 IN | WEIGHT: 170 LBS | SYSTOLIC BLOOD PRESSURE: 140 MMHG | BODY MASS INDEX: 25.18 KG/M2 | HEART RATE: 58 BPM

## 2018-04-18 DIAGNOSIS — I44.7 LBBB (LEFT BUNDLE BRANCH BLOCK): ICD-10-CM

## 2018-04-18 DIAGNOSIS — E78.5 DYSLIPIDEMIA: ICD-10-CM

## 2018-04-18 DIAGNOSIS — Z95.5 STENTED CORONARY ARTERY: ICD-10-CM

## 2018-04-18 DIAGNOSIS — I10 ESSENTIAL HYPERTENSION: ICD-10-CM

## 2018-04-18 DIAGNOSIS — I25.10 CORONARY ARTERY DISEASE, NON-OCCLUSIVE: ICD-10-CM

## 2018-04-18 PROCEDURE — 99214 OFFICE O/P EST MOD 30 MIN: CPT | Performed by: INTERNAL MEDICINE

## 2018-04-18 RX ORDER — CLOPIDOGREL BISULFATE 75 MG/1
75 TABLET ORAL DAILY
Qty: 90 TAB | Refills: 3 | Status: SHIPPED | OUTPATIENT
Start: 2018-04-18 | End: 2019-05-22 | Stop reason: SDUPTHER

## 2018-04-18 ASSESSMENT — ENCOUNTER SYMPTOMS
WHEEZING: 0
PALPITATIONS: 0
MYALGIAS: 0
DIZZINESS: 0
COUGH: 0
LOSS OF CONSCIOUSNESS: 0

## 2018-04-18 NOTE — PROGRESS NOTES
Chief Complaint   Patient presents with   • Coronary artery disease        Subjective:   Jose Angel George is a 89 y.o. male who presents today for follow-up evaluation of CAD, RCA and circumflex stents, hypertension and hyperlipidemia.    Last seen on 2/27/2017 by APN.    Since 2/27/2017 the patient has had no cardiac symptoms.  Had been living in [place], [place] over the past year but is moved back to [place], [place].  Followed at the [facility].  Compliant with medications.   Has problems with dry eyes and is seeking a more humid climate and may be moving to [place].    Past Medical History:   Diagnosis Date   • Hyperlipidemia    • Hypertension      Past Surgical History:   Procedure Laterality Date   • CARDIAC CATH  12/31/16    2 stents RCA, 1 stent Circ, All JAZMYNE.   • EYE SURGERY     • HERNIA REPAIR     • ROTATOR CUFF REPAIR       Family History   Problem Relation Age of Onset   • Stroke Mother    • Lung Disease Father    • Cancer Father    • Hypertension Father    • Heart Disease Father    • Hypertension Brother    • Diabetes Brother    • Heart Disease Brother      Social History     Social History   • Marital status: Single     Spouse name: N/A   • Number of children: N/A   • Years of education: N/A     Occupational History   • Not on file.     Social History Main Topics   • Smoking status: Former Smoker     Packs/day: 1.50     Years: 25.00     Types: Cigarettes     Quit date: 1/2/1970   • Smokeless tobacco: Never Used   • Alcohol use No   • Drug use: No   • Sexual activity: Not on file     Other Topics Concern   • Not on file     Social History Narrative   • No narrative on file     No Known Allergies  Outpatient Encounter Prescriptions as of 4/18/2018   Medication Sig Dispense Refill   • clopidogrel (PLAVIX) 75 MG Tab Take 1 Tab by mouth every day. 90 Tab 3   • aspirin (ASA) 81 MG Chew Tab chewable tablet Take 1 Tab by mouth every day. 100 Tab 3   • atorvastatin (LIPITOR) 80 MG tablet Take 1 Tab by mouth  "every evening. 90 Tab 3   • metoprolol SR (TOPROL XL) 25 MG TABLET SR 24 HR Take 1 Tab by mouth 2 Times a Day. 180 Tab 3   • nitroglycerin (NITROSTAT) 0.4 MG SL Tab Place 1 Tab under tongue as needed for Chest Pain (up to 3 doses 5 minutes apart. If chest pain not gone after 3 doses, call 911). 25 Tab 2   • latanoprost (XALATAN) 0.005 % Solution Place 1 Drop in both eyes every evening.     • [DISCONTINUED] clopidogrel (PLAVIX) 75 MG Tab Take 1 Tab by mouth every day. 90 Tab 3     No facility-administered encounter medications on file as of 4/18/2018.      Review of Systems   Respiratory: Negative for cough and wheezing.    Cardiovascular: Negative for chest pain and palpitations.   Musculoskeletal: Negative for myalgias.   Neurological: Negative for dizziness and loss of consciousness.        Objective:   /70   Pulse (!) 58   Ht 1.753 m (5' 9\")   Wt 77.1 kg (170 lb)   SpO2 92%   BMI 25.10 kg/m²     Physical Exam   Constitutional: He is oriented to person, place, and time. No distress.   Frail.   Neck: No JVD present.   Cardiovascular: Normal rate, regular rhythm, normal heart sounds and intact distal pulses.  Exam reveals no gallop and no friction rub.    No murmur heard.  Pulmonary/Chest: Effort normal and breath sounds normal. No respiratory distress. He has no wheezes. He has no rales.   Abdominal: Soft. He exhibits no distension and no mass. There is no tenderness.   Musculoskeletal: He exhibits no edema.   Neurological: He is alert and oriented to person, place, and time.   Skin: Skin is warm and dry.   Psychiatric: He has a normal mood and affect. His behavior is normal.     DATE OF SERVICE:  12/31/2016     PROCEDURE:   Cardiac catheterization and Percutaneous Coronary Intervention.  A.  Left heart catheterization.  B. Left ventriculography.  C. Selective coronary angiography.  D. Coronary stent implantation, proximal right coronary artery with a 4.0x16   mm drug-eluting Synergy stent.  E. Coronary " stent implantation of the mid right coronary artery with a 4.0x24   mm drug-eluting Synergy stent.  F.  Coronary stent implantation distal circumflex artery with a 2.75x12 mm   drug-eluting Synergy stent.  G. Right radial artery approach.  CONCLUSION:  1.  EF 56%.  Inferior wall motion abnormalities.  2.  LAD mid diffuse moderate.  3.   Diagonal ostial 70%.  Proximal 90%.  4.  Ramus is a tiny caliber mid 95%.  5.  Circumflex distal 90%.  6.  RCA proximal 95%, mid 90%.  7.  Proximal RCA stents a 4.0x16 mm JAZMYNE.  8.  Mid RCA stent 4.0x24 mm JAZMYNE.  9.  Distal circumflex 2.75x12 mm JAZMYNE.    Assessment:     1. Coronary artery disease, non-occlusive     2. Stented coronary artery  clopidogrel (PLAVIX) 75 MG Tab   3. Essential hypertension     4. Dyslipidemia     5. LBBB (left bundle branch block)         Medical Decision Making:  Today's Assessment / Status / Plan:     The patient is stable from a cardiovascular standpoint without any symptoms of ischemia, arrhythmia or heart failure.  Blood pressure is well controlled.  He is on atorvastatin for dyslipidemia which is being checked at the [facility].  Continue current therapy.  Follow-up 6 months.

## 2018-10-01 ENCOUNTER — OFFICE VISIT (OUTPATIENT)
Dept: CARDIOLOGY | Facility: MEDICAL CENTER | Age: 83
End: 2018-10-01
Payer: MEDICARE

## 2018-10-01 VITALS
DIASTOLIC BLOOD PRESSURE: 70 MMHG | SYSTOLIC BLOOD PRESSURE: 130 MMHG | HEART RATE: 56 BPM | WEIGHT: 170 LBS | BODY MASS INDEX: 25.18 KG/M2 | HEIGHT: 69 IN

## 2018-10-01 DIAGNOSIS — I25.10 CAD IN NATIVE ARTERY: ICD-10-CM

## 2018-10-01 DIAGNOSIS — E78.5 DYSLIPIDEMIA: ICD-10-CM

## 2018-10-01 DIAGNOSIS — Z95.5 STENTED CORONARY ARTERY: ICD-10-CM

## 2018-10-01 PROCEDURE — 99214 OFFICE O/P EST MOD 30 MIN: CPT | Performed by: INTERNAL MEDICINE

## 2018-10-01 RX ORDER — ATORVASTATIN CALCIUM 80 MG/1
80 TABLET, FILM COATED ORAL EVERY EVENING
Qty: 90 TAB | Refills: 3 | Status: SHIPPED | OUTPATIENT
Start: 2018-10-01 | End: 2019-11-05 | Stop reason: SDUPTHER

## 2018-10-01 RX ORDER — METOPROLOL SUCCINATE 25 MG/1
25 TABLET, EXTENDED RELEASE ORAL 2 TIMES DAILY
Qty: 180 TAB | Refills: 3 | Status: SHIPPED | OUTPATIENT
Start: 2018-10-01 | End: 2019-11-02 | Stop reason: SDUPTHER

## 2018-10-01 RX ORDER — ASPIRIN 81 MG/1
81 TABLET, CHEWABLE ORAL DAILY
Qty: 100 TAB | Refills: 3 | Status: SHIPPED | OUTPATIENT
Start: 2018-10-01 | End: 2019-12-18

## 2018-10-01 ASSESSMENT — ENCOUNTER SYMPTOMS
SHORTNESS OF BREATH: 0
LOSS OF CONSCIOUSNESS: 0
PALPITATIONS: 0
DIZZINESS: 0
MYALGIAS: 0
COUGH: 0

## 2018-10-01 NOTE — PROGRESS NOTES
Chief Complaint   Patient presents with   • Coronary Artery Disease       Subjective:   Jose Angel George is a 89 y.o. male who presents today or follow-up evaluation of CAD, RCA and circumflex stents, hypertension and hyperlipidemia with a history of recurrent low-grade bladder cancer.     Last seen on 4/18/2018.    Since 4/18/2018 appointment the patient has had no cardiac symptoms.  He is scheduled for a follow-up cystoscopy tomorrow at the [facility].  Has been off aspirin and Plavix and [facility]  No cardiac symptoms.     Since 2/27/2017 the patient has had no cardiac symptoms.  Had been living in [place], [place] over the past year but is moved back to [place], [place].  Followed at the [facility].  Compliant with medications.   Has problems with dry eyes and is seeking a more humid climate and may be moving to [place].    Past Medical History:   Diagnosis Date   • Hyperlipidemia    • Hypertension      Past Surgical History:   Procedure Laterality Date   • CARDIAC CATH  12/31/16    2 stents RCA, 1 stent Circ, All JAMZYNE.   • EYE SURGERY     • HERNIA REPAIR     • ROTATOR CUFF REPAIR       Family History   Problem Relation Age of Onset   • Stroke Mother    • Lung Disease Father    • Cancer Father    • Hypertension Father    • Heart Disease Father    • Hypertension Brother    • Diabetes Brother    • Heart Disease Brother      Social History     Social History   • Marital status: Single     Spouse name: N/A   • Number of children: N/A   • Years of education: N/A     Occupational History   • Not on file.     Social History Main Topics   • Smoking status: Former Smoker     Packs/day: 1.50     Years: 25.00     Types: Cigarettes     Quit date: 1/2/1970   • Smokeless tobacco: Never Used   • Alcohol use No   • Drug use: No   • Sexual activity: Not on file     Other Topics Concern   • Not on file     Social History Narrative   • No narrative on file     No Known  "Allergies  Outpatient Encounter Prescriptions as of 10/1/2018   Medication Sig Dispense Refill   • clopidogrel (PLAVIX) 75 MG Tab Take 1 Tab by mouth every day. 90 Tab 3   • aspirin (ASA) 81 MG Chew Tab chewable tablet Take 1 Tab by mouth every day. 100 Tab 3   • atorvastatin (LIPITOR) 80 MG tablet Take 1 Tab by mouth every evening. 90 Tab 3   • metoprolol SR (TOPROL XL) 25 MG TABLET SR 24 HR Take 1 Tab by mouth 2 Times a Day. 180 Tab 3   • nitroglycerin (NITROSTAT) 0.4 MG SL Tab Place 1 Tab under tongue as needed for Chest Pain (up to 3 doses 5 minutes apart. If chest pain not gone after 3 doses, call 911). 25 Tab 2   • latanoprost (XALATAN) 0.005 % Solution Place 1 Drop in both eyes every evening.       No facility-administered encounter medications on file as of 10/1/2018.      Review of Systems   Respiratory: Negative for cough and shortness of breath.    Cardiovascular: Negative for chest pain and palpitations.   Musculoskeletal: Negative for myalgias.   Neurological: Negative for dizziness and loss of consciousness.        Objective:   /70 (BP Location: Left arm, Patient Position: Sitting, BP Cuff Size: Adult)   Pulse (!) 56   Ht 1.753 m (5' 9\")   Wt 77.1 kg (170 lb)   BMI 25.10 kg/m²     Physical Exam   Constitutional: He is oriented to person, place, and time. He appears well-nourished.   Frail.   Neck: No JVD present.   Cardiovascular: Normal rate, regular rhythm, normal heart sounds and intact distal pulses.    Pulmonary/Chest: Effort normal and breath sounds normal. No respiratory distress. He has no wheezes. He has no rales.   Kyphosis.   Musculoskeletal: He exhibits no edema.   Neurological: He is alert and oriented to person, place, and time.   Skin: Skin is warm and dry.   Psychiatric: He has a normal mood and affect. His behavior is normal.     DATE OF SERVICE:  12/31/2016     PROCEDURE:   Cardiac catheterization and Percutaneous Coronary Intervention.  A.  Left heart catheterization.  B. " Left ventriculography.  C. Selective coronary angiography.  D. Coronary stent implantation, proximal right coronary artery with a 4.0x16   mm drug-eluting Synergy stent.  E. Coronary stent implantation of the mid right coronary artery with a 4.0x24   mm drug-eluting Synergy stent.  F.  Coronary stent implantation distal circumflex artery with a 2.75x12 mm   drug-eluting Synergy stent.  G. Right radial artery approach.  CONCLUSION:  1.  EF 56%.  Inferior wall motion abnormalities.  2.  LAD mid diffuse moderate.  3.   Diagonal ostial 70%.  Proximal 90%.  4.  Ramus is a tiny caliber mid 95%.  5.  Circumflex distal 90%.  6.  RCA proximal 95%, mid 90%.  7.  Proximal RCA stents a 4.0x16 mm JAZMYNE.  8.  Mid RCA stent 4.0x24 mm JAZMYNE.  9.  Distal circumflex 2.75x12 mm JAZMYNE.    Assessment:     1. CAD in native artery  metoprolol SR (TOPROL XL) 25 MG TABLET SR 24 HR   2. Dyslipidemia  atorvastatin (LIPITOR) 80 MG tablet   3. Stented coronary artery  aspirin (ASA) 81 MG Chew Tab chewable tablet       Medical Decision Making:  Today's Assessment / Status / Plan:   The patient is stable from a cardiovascular standpoint without any symptoms of ischemia, arrhythmia or heart failure.  Blood pressure is well controlled.  The patient was instructed to speak with his urologist as to when he can promptly re-resume his antiplatelet therapy and atorvastatin.  Follow-up 6 months.

## 2018-10-02 ENCOUNTER — TELEPHONE (OUTPATIENT)
Dept: CARDIOLOGY | Facility: MEDICAL CENTER | Age: 83
End: 2018-10-02

## 2018-10-02 NOTE — TELEPHONE ENCOUNTER
RX clarification   Received: Yesterday   Message Contents   Zakia Whitmore, Med Ass't  Janina Best RSoniaN.             Patient would like clarification on the metoprolol   Please call back: [email or web address] [id number] - [place], [place] - [address] AT [facility]      Contacted pharmacy.  Confirmed Metoprolol SR 25 mg BID medication.

## 2019-04-08 ENCOUNTER — OFFICE VISIT (OUTPATIENT)
Dept: CARDIOLOGY | Facility: MEDICAL CENTER | Age: 84
End: 2019-04-08
Payer: MEDICARE

## 2019-04-08 VITALS
WEIGHT: 173 LBS | HEIGHT: 69 IN | DIASTOLIC BLOOD PRESSURE: 72 MMHG | OXYGEN SATURATION: 95 % | HEART RATE: 62 BPM | SYSTOLIC BLOOD PRESSURE: 124 MMHG | BODY MASS INDEX: 25.62 KG/M2

## 2019-04-08 DIAGNOSIS — I44.7 LBBB (LEFT BUNDLE BRANCH BLOCK): ICD-10-CM

## 2019-04-08 DIAGNOSIS — E78.5 DYSLIPIDEMIA: ICD-10-CM

## 2019-04-08 DIAGNOSIS — Z95.5 STENTED CORONARY ARTERY: ICD-10-CM

## 2019-04-08 DIAGNOSIS — I25.10 CORONARY ARTERY DISEASE, NON-OCCLUSIVE: ICD-10-CM

## 2019-04-08 DIAGNOSIS — I10 ESSENTIAL HYPERTENSION: ICD-10-CM

## 2019-04-08 PROCEDURE — 99214 OFFICE O/P EST MOD 30 MIN: CPT | Performed by: INTERNAL MEDICINE

## 2019-04-08 RX ORDER — FUROSEMIDE 20 MG/1
TABLET ORAL
Refills: 0 | COMMUNITY
Start: 2019-03-07

## 2019-04-08 RX ORDER — FLUOCINONIDE 0.5 MG/G
OINTMENT TOPICAL
COMMUNITY
Start: 2019-01-16

## 2019-04-08 ASSESSMENT — ENCOUNTER SYMPTOMS
LOSS OF CONSCIOUSNESS: 0
DIZZINESS: 0
SHORTNESS OF BREATH: 0
MYALGIAS: 0
COUGH: 0
PALPITATIONS: 0

## 2019-04-08 NOTE — PROGRESS NOTES
Chief Complaint   Patient presents with   • Coronary Artery Disease       Subjective:   Jose Angel George is a 89 y.o. male who presents today for follow-up evaluation of CAD, RCA and circumflex stents, hypertension, LBBB and hyperlipidemia with a history of recurrent low-grade bladder cancer.     Last seen on 10/1/2018.    Since 10/1/2018 the patient said no cardiac symptoms.  The patient is moved to [place], [place] as a permanent residence now  He developed lower extremity edema and was given a 7-day course of diuretics is elevated his legs with resolution.    Since 4/18/2018 appointment the patient has had no cardiac symptoms.  He is scheduled for a follow-up cystoscopy tomorrow at the [facility].  Has been off aspirin and Plavix and atorvastatin for 5 days [facility]  No cardiac symptoms.     Since 2/27/2017 the patient has had no cardiac symptoms.  Had been living in [place], [place] over the past year but is moved back to [place], [place].  Followed at the [facility].  Compliant with medications.   Has problems with dry eyes and is seeking a more humid climate and may be moving to [place].    Past Medical History:   Diagnosis Date   • Hyperlipidemia    • Hypertension      Past Surgical History:   Procedure Laterality Date   • ZZZ CARDIAC CATH  12/31/16    2 stents RCA, 1 stent Circ, All JAZMYNE.   • EYE SURGERY     • HERNIA REPAIR     • ROTATOR CUFF REPAIR       Family History   Problem Relation Age of Onset   • Stroke Mother    • Lung Disease Father    • Cancer Father    • Hypertension Father    • Heart Disease Father    • Hypertension Brother    • Diabetes Brother    • Heart Disease Brother      Social History     Social History   • Marital status: Single     Spouse name: N/A   • Number of children: N/A   • Years of education: N/A     Occupational History   • Not on file.     Social History Main Topics   • Smoking status: Former Smoker     Packs/day: 1.50     Years: 25.00     Types:  "Cigarettes     Quit date: 1/2/1970   • Smokeless tobacco: Never Used   • Alcohol use No   • Drug use: No   • Sexual activity: Not on file     Other Topics Concern   • Not on file     Social History Narrative   • No narrative on file     No Known Allergies  Outpatient Encounter Prescriptions as of 4/8/2019   Medication Sig Dispense Refill   • metoprolol SR (TOPROL XL) 25 MG TABLET SR 24 HR Take 1 Tab by mouth 2 Times a Day. 180 Tab 3   • atorvastatin (LIPITOR) 80 MG tablet Take 1 Tab by mouth every evening. 90 Tab 3   • aspirin (ASA) 81 MG Chew Tab chewable tablet Take 1 Tab by mouth every day. 100 Tab 3   • clopidogrel (PLAVIX) 75 MG Tab Take 1 Tab by mouth every day. 90 Tab 3   • nitroglycerin (NITROSTAT) 0.4 MG SL Tab Place 1 Tab under tongue as needed for Chest Pain (up to 3 doses 5 minutes apart. If chest pain not gone after 3 doses, call 911). 25 Tab 2   • latanoprost (XALATAN) 0.005 % Solution Place 1 Drop in both eyes every evening.     • fluocinonide (LIDEX) 0.05 % Ointment      • furosemide (LASIX) 20 MG Tab TK 1 T PO QD FOR 7 DAYS  0     No facility-administered encounter medications on file as of 4/8/2019.      Review of Systems   Respiratory: Negative for cough and shortness of breath.    Cardiovascular: Negative for chest pain and palpitations.   Musculoskeletal: Negative for myalgias.   Neurological: Negative for dizziness and loss of consciousness.        Objective:   /72 (BP Location: Left arm, Patient Position: Sitting, BP Cuff Size: Adult)   Pulse 62   Ht 1.753 m (5' 9\")   Wt 78.5 kg (173 lb)   SpO2 95%   BMI 25.55 kg/m²     Physical Exam   Constitutional: He is oriented to person, place, and time. He appears well-nourished.   Frail.   Neck: No JVD present.   Cardiovascular: Normal rate, regular rhythm, normal heart sounds and intact distal pulses.    Pulmonary/Chest: Effort normal and breath sounds normal. No respiratory distress. He has no wheezes. He has no rales.   Kyphosis. "   Musculoskeletal: He exhibits edema.   Mild bilateral edema.   Neurological: He is alert and oriented to person, place, and time.   Skin: Skin is warm and dry.   Psychiatric: He has a normal mood and affect. His behavior is normal.     DATE OF SERVICE:  12/31/2016  PROCEDURE:   Cardiac catheterization and Percutaneous Coronary Intervention.  A.  Left heart catheterization.  B. Left ventriculography.  C. Selective coronary angiography.  D. Coronary stent implantation, proximal right coronary artery with a 4.0x16   mm drug-eluting Synergy stent.  E. Coronary stent implantation of the mid right coronary artery with a 4.0x24   mm drug-eluting Synergy stent.  F.  Coronary stent implantation distal circumflex artery with a 2.75x12 mm   drug-eluting Synergy stent.  G. Right radial artery approach.  CONCLUSION:  1.  EF 56%.  Inferior wall motion abnormalities.  2.  LAD mid diffuse moderate.  3.   Diagonal ostial 70%.  Proximal 90%.  4.  Ramus is a tiny caliber mid 95%.  5.  Circumflex distal 90%.  6.  RCA proximal 95%, mid 90%.  7.  Proximal RCA stents a 4.0x16 mm JAZMYNE.  8.  Mid RCA stent 4.0x24 mm JAZMYNE.  9.  Distal circumflex 2.75x12 mm JAZMYNE.    Assessment:     1. Coronary artery disease, non-occlusive     2. Stented coronary artery     3. LBBB (left bundle branch block)     4. Essential hypertension     5. Dyslipidemia         Medical Decision Making:  Today's Assessment / Status / Plan:     Assessment  1.  CAD.  Clinically stable.  2.  RCA and circumflex stents 12/31/2016.  3.  LBBB.  4.  Hypertension.  Controlled.  5.  Dyslipidemia.  Controlled on atorvastatin.    Recommendation Discussion  1.  I reviewed his most recent blood work including lipid profile was normal.  2.  Continue current cardiac therapy.  3.  We will forward medical records once he establishes a cardiologist in [place] or [facility].

## 2019-05-22 DIAGNOSIS — Z95.5 STENTED CORONARY ARTERY: ICD-10-CM

## 2019-05-24 RX ORDER — CLOPIDOGREL BISULFATE 75 MG/1
TABLET ORAL
Qty: 90 TAB | Refills: 3 | Status: SHIPPED | OUTPATIENT
Start: 2019-05-24

## 2019-08-29 ENCOUNTER — NEW PATIENT (OUTPATIENT)
Dept: URBAN - METROPOLITAN AREA CLINIC 51 | Facility: CLINIC | Age: 84
End: 2019-08-29
Payer: MEDICARE

## 2019-08-29 DIAGNOSIS — H16.143 PUNCTATE KERATITIS, BILATERAL: ICD-10-CM

## 2019-08-29 DIAGNOSIS — H40.1132 PRIMARY OPEN-ANGLE GLAUCOMA, MODERATE STAGE, BILATERAL: Primary | ICD-10-CM

## 2019-08-29 DIAGNOSIS — H04.562 STENOSIS OF LEFT LACRIMAL PUNCTUM: ICD-10-CM

## 2019-08-29 DIAGNOSIS — H43.813 VITREOUS DEGENERATION, BILATERAL: ICD-10-CM

## 2019-08-29 DIAGNOSIS — H35.3131 NONEXUDATIVE MACULAR DEGENERATION, EARLY DRY STAGE, BILATERAL: ICD-10-CM

## 2019-08-29 PROCEDURE — 92134 CPTRZ OPH DX IMG PST SGM RTA: CPT | Performed by: OPTOMETRIST

## 2019-08-29 PROCEDURE — 76514 ECHO EXAM OF EYE THICKNESS: CPT | Performed by: OPTOMETRIST

## 2019-08-29 PROCEDURE — 92133 CPTRZD OPH DX IMG PST SGM ON: CPT | Performed by: OPTOMETRIST

## 2019-08-29 PROCEDURE — 92004 COMPRE OPH EXAM NEW PT 1/>: CPT | Performed by: OPTOMETRIST

## 2019-08-29 RX ORDER — LATANOPROST 50 UG/ML
0.005 % SOLUTION OPHTHALMIC
Qty: 3 | Refills: 3 | Status: ACTIVE
Start: 2019-08-29

## 2019-08-29 ASSESSMENT — VISUAL ACUITY
OS: 20/20
OD: 20/20

## 2019-08-29 ASSESSMENT — INTRAOCULAR PRESSURE
OS: 14
OD: 16

## 2019-08-29 ASSESSMENT — KERATOMETRY
OS: 44.84
OD: 44.90

## 2019-11-02 DIAGNOSIS — I25.10 CAD IN NATIVE ARTERY: ICD-10-CM

## 2019-11-04 RX ORDER — METOPROLOL SUCCINATE 25 MG/1
TABLET, EXTENDED RELEASE ORAL
Qty: 180 TAB | Refills: 0 | Status: SHIPPED | OUTPATIENT
Start: 2019-11-04 | End: 2020-04-06 | Stop reason: SDUPTHER

## 2019-11-05 DIAGNOSIS — E78.5 DYSLIPIDEMIA: ICD-10-CM

## 2019-11-06 RX ORDER — ATORVASTATIN CALCIUM 80 MG/1
TABLET, FILM COATED ORAL
Qty: 90 TAB | Refills: 0 | Status: SHIPPED | OUTPATIENT
Start: 2019-11-06 | End: 2019-11-08 | Stop reason: SDUPTHER

## 2019-11-08 DIAGNOSIS — E78.5 DYSLIPIDEMIA: ICD-10-CM

## 2019-11-16 RX ORDER — ATORVASTATIN CALCIUM 80 MG/1
80 TABLET, FILM COATED ORAL EVERY EVENING
Qty: 90 TAB | Refills: 2 | Status: SHIPPED | OUTPATIENT
Start: 2019-11-16 | End: 2020-02-07

## 2019-11-20 ENCOUNTER — FOLLOW UP ESTABLISHED (OUTPATIENT)
Dept: URBAN - METROPOLITAN AREA CLINIC 51 | Facility: CLINIC | Age: 84
End: 2019-11-20
Payer: MEDICARE

## 2019-11-20 PROCEDURE — 92083 EXTENDED VISUAL FIELD XM: CPT | Performed by: OPTOMETRIST

## 2019-11-20 PROCEDURE — 92012 INTRM OPH EXAM EST PATIENT: CPT | Performed by: OPTOMETRIST

## 2019-11-20 ASSESSMENT — INTRAOCULAR PRESSURE
OD: 12
OS: 13

## 2019-12-17 DIAGNOSIS — Z95.5 STENTED CORONARY ARTERY: ICD-10-CM

## 2019-12-19 RX ORDER — ASPIRIN 81 MG/1
TABLET, CHEWABLE ORAL
Qty: 100 TAB | Refills: 1 | Status: SHIPPED | OUTPATIENT
Start: 2019-12-19

## 2020-02-06 DIAGNOSIS — E78.5 DYSLIPIDEMIA: ICD-10-CM

## 2020-02-10 RX ORDER — ATORVASTATIN CALCIUM 80 MG/1
80 TABLET, FILM COATED ORAL EVERY EVENING
Qty: 90 TAB | Refills: 0 | Status: SHIPPED | OUTPATIENT
Start: 2020-02-10

## 2020-04-06 ENCOUNTER — TELEPHONE (OUTPATIENT)
Dept: CARDIOLOGY | Facility: MEDICAL CENTER | Age: 85
End: 2020-04-06

## 2020-04-06 DIAGNOSIS — I25.10 CAD IN NATIVE ARTERY: ICD-10-CM

## 2020-04-06 NOTE — TELEPHONE ENCOUNTER
Zakia    Pt calling for renewal of metoprolol. Pt has about 7-10 days of medication remaining.  Pt no longer uses [facility] in [place].  Pt wants pharmacy changed [facility] [address] in [facility].      Please call pt if questions

## 2020-04-09 RX ORDER — METOPROLOL SUCCINATE 25 MG/1
25 TABLET, EXTENDED RELEASE ORAL 2 TIMES DAILY
Qty: 180 TAB | Refills: 0 | Status: SHIPPED | OUTPATIENT
Start: 2020-04-09

## 2020-05-04 ENCOUNTER — TELEPHONE (OUTPATIENT)
Dept: CARDIOLOGY | Facility: MEDICAL CENTER | Age: 85
End: 2020-05-04

## 2020-05-04 NOTE — TELEPHONE ENCOUNTER
LACI CHOWDARY JR Key: [id number] help? Call us at [phone]  Outcome  Additional Information Required  [facility] has indicated that it is too soon to refill this medication at the pharmacy for your patient. If you need to renew an existing PA for your patient's medication, please reach out to [facility] directly at [phone]  Drug  Metoprolol Succinate ER 25MG er tablets  Form  [facility] Electronic PA Form      Pharmacy informed to contact help desk to process claim if refill too soon is the error code

## 2020-06-10 DIAGNOSIS — E78.5 DYSLIPIDEMIA: ICD-10-CM

## 2020-06-10 DIAGNOSIS — Z95.5 STENTED CORONARY ARTERY: ICD-10-CM

## 2020-06-10 RX ORDER — CLOPIDOGREL BISULFATE 75 MG/1
75 TABLET ORAL
Qty: 30 TAB | Refills: 0 | OUTPATIENT
Start: 2020-06-10

## 2020-06-10 RX ORDER — ATORVASTATIN CALCIUM 80 MG/1
80 TABLET, FILM COATED ORAL EVERY EVENING
Qty: 30 TAB | Refills: 0 | OUTPATIENT
Start: 2020-06-10

## 2023-05-01 ENCOUNTER — OFFICE VISIT (OUTPATIENT)
Dept: URBAN - METROPOLITAN AREA CLINIC 51 | Facility: CLINIC | Age: 88
End: 2023-05-01
Payer: MEDICARE

## 2023-05-01 DIAGNOSIS — H16.143 PUNCTATE KERATITIS, BILATERAL: ICD-10-CM

## 2023-05-01 DIAGNOSIS — H52.4 PRESBYOPIA: ICD-10-CM

## 2023-05-01 DIAGNOSIS — H35.3131 NONEXUDATIVE MACULAR DEGENERATION, EARLY DRY STAGE, BILATERAL: ICD-10-CM

## 2023-05-01 DIAGNOSIS — H40.1132 PRIMARY OPEN-ANGLE GLAUCOMA, BILATERAL, MODERATE STAGE: Primary | ICD-10-CM

## 2023-05-01 PROCEDURE — 92133 CPTRZD OPH DX IMG PST SGM ON: CPT | Performed by: OPTOMETRIST

## 2023-05-01 PROCEDURE — 92134 CPTRZ OPH DX IMG PST SGM RTA: CPT | Performed by: OPTOMETRIST

## 2023-05-01 PROCEDURE — 99204 OFFICE O/P NEW MOD 45 MIN: CPT | Performed by: OPTOMETRIST

## 2023-05-01 RX ORDER — LATANOPROST 50 UG/ML
0.005 % SOLUTION OPHTHALMIC
Qty: 7.5 | Refills: 1 | Status: ACTIVE
Start: 2023-05-01

## 2023-05-01 ASSESSMENT — VISUAL ACUITY
OD: 20/30
OS: 20/25

## 2023-05-01 ASSESSMENT — INTRAOCULAR PRESSURE
OD: 11
OS: 12

## 2023-05-01 NOTE — IMPRESSION/PLAN
Impression: Punctate keratitis, bilateral Plan: Recommend use:
Lipid based Artificial tears QID OU Warm compresses BID Eyelid scrubs/cleanser 2-3 times per week
> 600 mg Omega 3 per day Hydrate / Add humidifier / Avoid direct air flow / Blink fully Handout provided [History reviewed] : History reviewed. [Medications and Allergies reviewed] : Medications and allergies reviewed.

## 2023-05-01 NOTE — IMPRESSION/PLAN
Impression: Primary open-angle glaucoma, moderate stage, bilateral
PACHY 547/545 Plan: IOP 11/12 RNFL today shows general thinning oU Restart Latanoprost QHS OU- last used 2 days ago

rtc in 3 months for IOP check with [facility]

## 2023-08-02 ENCOUNTER — OFFICE VISIT (OUTPATIENT)
Dept: URBAN - METROPOLITAN AREA CLINIC 51 | Facility: CLINIC | Age: 88
End: 2023-08-02
Payer: MEDICARE

## 2023-08-02 DIAGNOSIS — H40.1132 PRIMARY OPEN-ANGLE GLAUCOMA, BILATERAL, MODERATE STAGE: Primary | ICD-10-CM

## 2023-08-02 PROCEDURE — 92083 EXTENDED VISUAL FIELD XM: CPT | Performed by: OPTOMETRIST

## 2023-08-02 PROCEDURE — 99213 OFFICE O/P EST LOW 20 MIN: CPT | Performed by: OPTOMETRIST

## 2023-08-02 ASSESSMENT — INTRAOCULAR PRESSURE
OS: 10
OD: 10